# Patient Record
Sex: MALE | Race: WHITE
[De-identification: names, ages, dates, MRNs, and addresses within clinical notes are randomized per-mention and may not be internally consistent; named-entity substitution may affect disease eponyms.]

---

## 2019-03-08 ENCOUNTER — HOSPITAL ENCOUNTER (OUTPATIENT)
Dept: HOSPITAL 92 - BICULT | Age: 69
Discharge: HOME | End: 2019-03-08
Attending: PHYSICIAN ASSISTANT
Payer: MEDICARE

## 2019-03-08 DIAGNOSIS — K76.9: ICD-10-CM

## 2019-03-08 DIAGNOSIS — R74.8: Primary | ICD-10-CM

## 2019-03-08 PROCEDURE — 76705 ECHO EXAM OF ABDOMEN: CPT

## 2019-03-08 NOTE — ULT
ULTRASOUND HEPATIC DOPPLER:

 

Date:  03/08/19 

 

HISTORY:  

Elevated liver enzymes. 

 

COMPARISON:  

None. 

 

TECHNIQUE:  

Real-time Gray scale and color Doppler with spectral analysis of the liver was performed. 

 

FINDINGS:

There is diffuse increased hepatic echotexture. Liver measures 17.0 cm in length. Gallbladder is norm
al. No pericholecystic fluid. Portal vein is patent with antegrade flow. Normal phasicity. Hepatic ve
ins are patent. 

Common bile duct is normal. Spleen measures 11.0 cm in length. Splenic artery and vein are patent. 

 

IMPRESSION: 

1.  Mildly increased hepatic echotexture suggesting hepatic steatosis. 

2.  Normal directional vascular flow and phasicity. 

 

 

 

 

POS: CET

## 2019-12-04 ENCOUNTER — HOSPITAL ENCOUNTER (OUTPATIENT)
Dept: HOSPITAL 92 - LABBT | Age: 69
Discharge: HOME | End: 2019-12-04
Attending: UROLOGY
Payer: MEDICARE

## 2019-12-04 DIAGNOSIS — N40.1: ICD-10-CM

## 2019-12-04 DIAGNOSIS — Z01.818: Primary | ICD-10-CM

## 2019-12-04 DIAGNOSIS — R97.20: ICD-10-CM

## 2019-12-04 LAB
ANION GAP SERPL CALC-SCNC: 13 MMOL/L (ref 10–20)
BASOPHILS # BLD AUTO: 0.1 THOU/UL (ref 0–0.2)
BASOPHILS NFR BLD AUTO: 1.1 % (ref 0–1)
BUN SERPL-MCNC: 22 MG/DL (ref 8.4–25.7)
CALCIUM SERPL-MCNC: 9.6 MG/DL (ref 7.8–10.44)
CHLORIDE SERPL-SCNC: 103 MMOL/L (ref 98–107)
CO2 SERPL-SCNC: 27 MMOL/L (ref 23–31)
CREAT CL PREDICTED SERPL C-G-VRATE: 0 ML/MIN (ref 70–130)
EOSINOPHIL # BLD AUTO: 0.3 THOU/UL (ref 0–0.7)
EOSINOPHIL NFR BLD AUTO: 3.6 % (ref 0–10)
GLUCOSE SERPL-MCNC: 107 MG/DL (ref 80–115)
HGB BLD-MCNC: 16.2 G/DL (ref 14–18)
LYMPHOCYTES # BLD: 1.6 THOU/UL (ref 1.2–3.4)
LYMPHOCYTES NFR BLD AUTO: 20.3 % (ref 21–51)
MCH RBC QN AUTO: 29.9 PG (ref 27–31)
MCV RBC AUTO: 93.7 FL (ref 78–98)
MONOCYTES # BLD AUTO: 0.8 THOU/UL (ref 0.11–0.59)
MONOCYTES NFR BLD AUTO: 10.7 % (ref 0–10)
NEUTROPHILS # BLD AUTO: 4.9 THOU/UL (ref 1.4–6.5)
NEUTROPHILS NFR BLD AUTO: 64.3 % (ref 42–75)
PLATELET # BLD AUTO: 167 THOU/UL (ref 130–400)
POTASSIUM SERPL-SCNC: 3.9 MMOL/L (ref 3.5–5.1)
PROT UR STRIP.AUTO-MCNC: 20 MG/DL
RBC # BLD AUTO: 5.41 MILL/UL (ref 4.7–6.1)
RBC UR QL AUTO: (no result) HPF (ref 0–3)
SODIUM SERPL-SCNC: 139 MMOL/L (ref 136–145)
WBC # BLD AUTO: 7.7 THOU/UL (ref 4.8–10.8)
WBC UR QL AUTO: (no result) HPF (ref 0–3)

## 2019-12-04 PROCEDURE — 93010 ELECTROCARDIOGRAM REPORT: CPT

## 2019-12-04 PROCEDURE — 93005 ELECTROCARDIOGRAM TRACING: CPT

## 2019-12-05 NOTE — EKG
Test Reason : 

Blood Pressure : ***/*** mmHG

Vent. Rate : 050 BPM     Atrial Rate : 050 BPM

   P-R Int : 176 ms          QRS Dur : 088 ms

    QT Int : 436 ms       P-R-T Axes : 033 057 026 degrees

   QTc Int : 397 ms

 

Sinus bradycardia

Otherwise normal ECG

No previous ECGs available

Confirmed by JANIS HOUSTON ([id number]) on 12/5/2019 9:29:35 PM

 

Referred By:  LEELA           Confirmed By:JANIS HOUSTON

## 2019-12-09 ENCOUNTER — HOSPITAL ENCOUNTER (OUTPATIENT)
Dept: HOSPITAL 92 - LABBT | Age: 69
Discharge: HOME | End: 2019-12-09
Attending: UROLOGY
Payer: MEDICARE

## 2019-12-09 DIAGNOSIS — N40.1: ICD-10-CM

## 2019-12-09 DIAGNOSIS — R97.20: ICD-10-CM

## 2019-12-09 DIAGNOSIS — Z01.812: Primary | ICD-10-CM

## 2019-12-09 LAB
APTT PPP: 32.8 SEC (ref 22.9–36.1)
INR PPP: 1
PROTHROMBIN TIME: 13.5 SEC (ref 12–14.7)

## 2019-12-09 PROCEDURE — 85610 PROTHROMBIN TIME: CPT

## 2019-12-09 PROCEDURE — 85730 THROMBOPLASTIN TIME PARTIAL: CPT

## 2019-12-12 ENCOUNTER — HOSPITAL ENCOUNTER (OUTPATIENT)
Dept: HOSPITAL 92 - SDC | Age: 69
LOS: 1 days | Discharge: HOME | End: 2019-12-13
Attending: UROLOGY
Payer: MEDICARE

## 2019-12-12 VITALS — BODY MASS INDEX: 30.5 KG/M2

## 2019-12-12 DIAGNOSIS — E11.9: ICD-10-CM

## 2019-12-12 DIAGNOSIS — I10: ICD-10-CM

## 2019-12-12 DIAGNOSIS — N13.8: ICD-10-CM

## 2019-12-12 DIAGNOSIS — Z79.899: ICD-10-CM

## 2019-12-12 DIAGNOSIS — Z79.84: ICD-10-CM

## 2019-12-12 DIAGNOSIS — N40.1: Primary | ICD-10-CM

## 2019-12-12 PROCEDURE — 88305 TISSUE EXAM BY PATHOLOGIST: CPT

## 2019-12-12 PROCEDURE — 36415 COLL VENOUS BLD VENIPUNCTURE: CPT

## 2019-12-12 PROCEDURE — 0VT08ZZ RESECTION OF PROSTATE, VIA NATURAL OR ARTIFICIAL OPENING ENDOSCOPIC: ICD-10-PCS | Performed by: UROLOGY

## 2019-12-12 PROCEDURE — 36416 COLLJ CAPILLARY BLOOD SPEC: CPT

## 2019-12-12 PROCEDURE — 87086 URINE CULTURE/COLONY COUNT: CPT

## 2019-12-12 PROCEDURE — 81001 URINALYSIS AUTO W/SCOPE: CPT

## 2019-12-12 PROCEDURE — 80048 BASIC METABOLIC PNL TOTAL CA: CPT

## 2019-12-12 PROCEDURE — 85025 COMPLETE CBC W/AUTO DIFF WBC: CPT

## 2019-12-12 RX ADMIN — LISINOPRIL AND HYDROCHLOROTHIAZIDE SCH TAB: 12.5; 2 TABLET ORAL at 21:01

## 2019-12-12 NOTE — OP
DATE OF PROCEDURE:  12/12/2019



SERVICE:  Urology.



PREOPERATIVE DIAGNOSIS:  BPH with urinary obstruction.



POSTOPERATIVE DIAGNOSIS:  BPH with urinary obstruction.



PROCEDURE PERFORMED:  Transurethral resection of the prostate.



INDICATION FOR PROCEDURE:  Mr. Segura is a 69-year-old white male, who previously had

seen me for BPH complaints.  We have started him on Rapaflo, which had helped his

symptoms, but he did not want to remain on medication indefinitely and did not have

complete relief of symptoms.  We discussed UroLift, but his prostate was too large,

so we elected to opt to go for TURP instead.  Risks and benefits of the surgery were

discussed and he has agreed to proceed forward. 



DESCRIPTION OF PROCEDURE:  After identification of armband and verification of

consent, the patient was brought back to the operating room, where he underwent

general anesthesia with LMA.  He was then placed in dorsal lithotomy position and

prepped and draped in usual sterile fashion.  After appropriate time-out, a

lubricated 26-Afghan resectoscope sheath with visual obturator was passed through

the urethra into the bladder.  Both ureteral orifices were noted in the orthotopic

location.  The visual obturator was switched out for the bipolar prostate

resectoscope loop and resection was started at the bladder neck and taken back to

the verumontanum circumferentially.  Relaxing incisions were made at 5 o'clock and 7

o'clock on the bladder neck for a wide open bladder neck.  All intervening tissue

was resected until we were close to, but not through the capsule.  Resection was

taken up to the verumontanum, but not past that to avoid sphincteric injury.  Upon

completion, the prostate was wide open.  There did not appear to be any injuries to

the sphincter or to the bladder mucosa.  Both ureters were identified in orthotopic

location.  All chips were evacuated using the [employer] evacuator or manually with the

resectoscope, and meticulous hemostasis was then performed of all tissues until

there was no bleeding visualized.  Upon completion, the resectoscope was removed and

a 22-Afghan three-way Snyder catheter was placed into the patient's bladder.  A 30 mL

of sterile water was placed into the balloon and CBI initiated.  The patient had his

catheter affixed with a StatLock, and had B and O suppository placed in his rectum.

He was then taken fully out of positioning, awakened, and taken to PACU for recovery

in stable condition. 



COMPLICATIONS:  None.



ESTIMATED BLOOD LOSS:  Minimal.



RETAINED TUBES AND DRAINS:  A 22-Afghan three-way Snyder catheter.



SPECIMENS:  Prostate chips.



DISPOSITION:  The patient will be kept in the hospital overnight.  We will plan for

CBI to be discontinued tomorrow and then a void trial and be discharged to home with

followup on an outpatient basis. 







Job ID:  [id number]

## 2019-12-13 VITALS — DIASTOLIC BLOOD PRESSURE: 73 MMHG | SYSTOLIC BLOOD PRESSURE: 151 MMHG | TEMPERATURE: 98.4 F

## 2019-12-13 LAB
ANION GAP SERPL CALC-SCNC: 12 MMOL/L (ref 10–20)
BASOPHILS # BLD AUTO: 0 THOU/UL (ref 0–0.2)
BASOPHILS NFR BLD AUTO: 0.7 % (ref 0–1)
BUN SERPL-MCNC: 29 MG/DL (ref 8.4–25.7)
CALCIUM SERPL-MCNC: 8.6 MG/DL (ref 7.8–10.44)
CHLORIDE SERPL-SCNC: 106 MMOL/L (ref 98–107)
CO2 SERPL-SCNC: 23 MMOL/L (ref 23–31)
CREAT CL PREDICTED SERPL C-G-VRATE: 70 ML/MIN (ref 70–130)
EOSINOPHIL # BLD AUTO: 0.2 THOU/UL (ref 0–0.7)
EOSINOPHIL NFR BLD AUTO: 3.3 % (ref 0–10)
GLUCOSE SERPL-MCNC: 105 MG/DL (ref 80–115)
HGB BLD-MCNC: 14.5 G/DL (ref 14–18)
LYMPHOCYTES # BLD: 1.2 THOU/UL (ref 1.2–3.4)
LYMPHOCYTES NFR BLD AUTO: 16.4 % (ref 21–51)
MCH RBC QN AUTO: 30.8 PG (ref 27–31)
MCV RBC AUTO: 91.4 FL (ref 78–98)
MONOCYTES # BLD AUTO: 0.7 THOU/UL (ref 0.11–0.59)
MONOCYTES NFR BLD AUTO: 10.2 % (ref 0–10)
NEUTROPHILS # BLD AUTO: 5 THOU/UL (ref 1.4–6.5)
NEUTROPHILS NFR BLD AUTO: 69.4 % (ref 42–75)
PLATELET # BLD AUTO: 157 THOU/UL (ref 130–400)
POTASSIUM SERPL-SCNC: 4.4 MMOL/L (ref 3.5–5.1)
RBC # BLD AUTO: 4.72 MILL/UL (ref 4.7–6.1)
SODIUM SERPL-SCNC: 137 MMOL/L (ref 136–145)
WBC # BLD AUTO: 7.2 THOU/UL (ref 4.8–10.8)

## 2019-12-13 RX ADMIN — LISINOPRIL AND HYDROCHLOROTHIAZIDE SCH TAB: 12.5; 2 TABLET ORAL at 08:30

## 2019-12-13 NOTE — PRG
DATE OF SERVICE:  12/13/2019



SUBJECTIVE:  Patient states he is feeling fine.  He has no pain.  He has no

significant bladder spasms, chest pain, or shortness of breath. 



OBJECTIVE:  VITAL SIGNS:  Temperature 98.4, pulse 60, respirations 18, blood

pressure 151/73, saturation 91% on room air. 

GENERAL:  No apparent distress.  Communicative and alert. 

CARDIOVASCULAR:  Regular rate and rhythm.  Normal S1, S2. 

ABDOMEN:  Soft, nontender, and nondistended.  Positive bowel sounds. 

CHEST:  No increased work of breathing, symmetric expansion of lungs, clear

anteriorly. 

:  Snyder catheter in place, draining light pink urine off CBI. 

EXTREMITIES:  No clubbing, cyanosis, or edema.



LABORATORY DATA:  The full set of labs are in the [facility] [employer] system, which I have

reviewed.  Of note, the patient's hemoglobin is 14.5 with a platelet count of 157.

Creatinine is 1.29. 



ASSESSMENT AND PLAN:  A 69-year-old white male, status post TURP, postoperative day

1, recovering very well.  We will do a void trial so long as he is able to urinate

with relatively clear urine.  I think he can be discharged home.  He can follow up

with me in approximately 2 to 3 weeks for postop check. 







Job ID:  [id number]

## 2019-12-14 NOTE — DIS
DATE OF ADMISSION:  12/12/2019



DATE OF DISCHARGE:  12/13/2019



ADMITTING:  Geo Rivera MD.



DISCHARGING:  Geo Rivera MD



ADMITTING DIAGNOSIS:  BPH.



DISCHARGE DIAGNOSES:  BPH.



PROCEDURE PERFORMED:  Transurethral resection of prostate.



BRIEF HISTORY:  Mr. Segura is a 69-year-old white male with history of BPH and urinary

symptoms.  He has come into the hospital for a TURP.  Please see the full H and P

scanned into the [facility] for full history and physical. 



HOSPITAL COURSE:  The patient underwent TURP (please see operative note for

details).  Postoperatively, he was kept in the hospital on CBI.  His urine looked

relatively clear.  The next day, although it was still having some mild hematuria.

CBI was stopped.  Urine was not excessively bloody.  He voided 3 times, which

subsequently got clear and more clear.  The patient was deemed stable and enough to

be discharged home and was sent home without any further complications. 



DISPOSITION:  Discharge to home.



DISCHARGE CONDITION:  Good.



DISCHARGE MEDICATIONS:  Resuming all of his home medications except his aspirin,

which he will hold as long as his urine is bloody and he does not need to take 

Flomax anymore.  Additionally, he was given prescriptions for tramadol, Colace,

Pyridium, and oxybutynin. 



DISCHARGE INSTRUCTIONS:  Explained to the patient.  He will follow up with me in 2

to 3 weeks for postop check. 







Job ID:  [id number]

## 2023-12-12 ENCOUNTER — HOSPITAL ENCOUNTER (OUTPATIENT)
Dept: HOSPITAL 92 - CSHMRI | Age: 73
Discharge: HOME | End: 2023-12-12
Attending: UROLOGY
Payer: MEDICARE

## 2023-12-12 DIAGNOSIS — R97.20: Primary | ICD-10-CM

## 2023-12-12 DIAGNOSIS — R93.89: ICD-10-CM

## 2023-12-12 DIAGNOSIS — N42.89: ICD-10-CM

## 2023-12-12 LAB — EGFRCR SERPLBLD CKD-EPI 2021: 64 ML/MIN/{1.73_M2}

## 2023-12-12 PROCEDURE — 82565 ASSAY OF CREATININE: CPT

## 2023-12-12 PROCEDURE — 72197 MRI PELVIS W/O & W/DYE: CPT
